# Patient Record
Sex: FEMALE | Race: WHITE
[De-identification: names, ages, dates, MRNs, and addresses within clinical notes are randomized per-mention and may not be internally consistent; named-entity substitution may affect disease eponyms.]

---

## 2020-07-10 NOTE — PDOC.LDHP
Labor and Delivery H&P


Chief complaint: loss of fluid


HPI: 





25 y/o  @ 26.5 wks presents after sudden LOF per vagina at 12 today. 


she states she was taking a nap when suddenly she had a gush of fluids per 

vagina. she does not know if this was urine or ROM. 


she states it did not smell of urine. 


Pt denies nay further leaking of fluids per vagina after this event. 


denies vag bleeding, vag discharge/irritation/pain. 


last intercourse was >24 hours ago and pt and partner state any time between 3-

5 days ago, but for sure not in the last 24 hours. 


reports good fetal movements. 


c/o cramping located over lower abdomen that radiates straight down. 


Denies Pavon, CP, SOB, fevers. 





PCP: Dr. Escalera. 


Current gestational age (weeks): 26 (5 days )


Grav: 2


Para: 1


OB History Details: 





Preg #1:  4 years ago @ 37 wks 


Current pregnancy complications: none


Past Medical History: 





idiopathic intracranial HTN 


Cholelithiasis 


Current medications: pre- vitamins


Previous surgical history: none


Allergies/Adverse Reactions: 


 Allergies











Allergy/AdvReac Type Severity Reaction Status Date / Time


 


amoxicillin Allergy   Verified 20 14:42











Social history: none





- Physical Exam


Vital signs reviewed and normal: yes


General: NAD, resting, breathing through contractions


Heart: RRR


Lungs: CTAB


Abdomen: gravid


Extremeties: no edema


FHT: variability present


[place] contractions every: non present on TOCO





- Vaginal Exam


cm dilated: 0


Effacement: 0%


Station: -3





- OB Labs


GBS: positive (UTI colonized in current pregnancy)





- Assessment





 25 y/o  @ 26.5 wk 





1. sIUP @ 26.5 wks 


- no ccx on 20 minute


- 20 min fht strip reassuring. 





2. Objective LOF 


- FFN ordered. collected VP3, Gc/C. will f/u with results 


- Sterile spec exam performed: no pooling of fluids on valsalva, yellow d/c. 


- not suspecting ROM. 


will f/u with pt on results. 





3. lower abd pain with cramping


- UA with reflex ccx 


- will f/u on results and ccx 





Dispo: will d/c home and follow up on results via phone. low clinical suspicion 

for PPROM. 





Care plan discussed with Dr. Leiva, who is in agreement with above stated 

plan. 








Addendum - Attending





- Attending Attestation


Date/Time: 20 [id number]





I personally evaluated the patient and discussed the management with Dr. Davey


I agree with the History, Examination, Assessment and Plan documented above 

with any addition or exceptions noted below.


FFN, UA, VP3 NEg. Pt has been called and results shared.

## 2020-07-11 NOTE — PDOC.BPN
- Brief Progress Note





care plan discussed with Dr. Leiva, who opted to d/c pt home with f/u on 

results of FFN, UA, VP3, GC/C. 


Pt d/c home in good condition.


return precautions given. 


f/u with pcp in 2-3 days.

## 2020-09-30 ENCOUNTER — HOSPITAL ENCOUNTER (INPATIENT)
Dept: HOSPITAL 92 - L&D/OP | Age: 25
LOS: 1 days | Discharge: HOME | End: 2020-10-01
Attending: OBSTETRICS & GYNECOLOGY | Admitting: OBSTETRICS & GYNECOLOGY
Payer: COMMERCIAL

## 2020-09-30 VITALS — BODY MASS INDEX: 50.4 KG/M2

## 2020-09-30 DIAGNOSIS — Z3A.39: ICD-10-CM

## 2020-09-30 DIAGNOSIS — B95.1: ICD-10-CM

## 2020-09-30 LAB
HBSAG INDEX: 0.16 S/CO (ref 0–0.99)
HGB BLD-MCNC: 12.2 G/DL (ref 12–16)
MCH RBC QN AUTO: 30.8 PG (ref 27–31)
MCV RBC AUTO: 91.1 FL (ref 78–98)
PLATELET # BLD AUTO: 169 THOU/UL (ref 130–400)
RBC # BLD AUTO: 3.96 MILL/UL (ref 4.2–5.4)
SYPHILIS ANTIBODY INDEX: 0.05 S/CO
WBC # BLD AUTO: 12.6 THOU/UL (ref 4.8–10.8)

## 2020-09-30 PROCEDURE — 36416 COLLJ CAPILLARY BLOOD SPEC: CPT

## 2020-09-30 PROCEDURE — 86780 TREPONEMA PALLIDUM: CPT

## 2020-09-30 PROCEDURE — 99285 EMERGENCY DEPT VISIT HI MDM: CPT

## 2020-09-30 PROCEDURE — 51702 INSERT TEMP BLADDER CATH: CPT

## 2020-09-30 PROCEDURE — 0HQ9XZZ REPAIR PERINEUM SKIN, EXTERNAL APPROACH: ICD-10-PCS | Performed by: OBSTETRICS & GYNECOLOGY

## 2020-09-30 PROCEDURE — 86900 BLOOD TYPING SEROLOGIC ABO: CPT

## 2020-09-30 PROCEDURE — 36415 COLL VENOUS BLD VENIPUNCTURE: CPT

## 2020-09-30 PROCEDURE — 87635 SARS-COV-2 COVID-19 AMP PRB: CPT

## 2020-09-30 PROCEDURE — 86850 RBC ANTIBODY SCREEN: CPT

## 2020-09-30 PROCEDURE — U0003 INFECTIOUS AGENT DETECTION BY NUCLEIC ACID (DNA OR RNA); SEVERE ACUTE RESPIRATORY SYNDROME CORONAVIRUS 2 (SARS-COV-2) (CORONAVIRUS DISEASE [COVID-19]), AMPLIFIED PROBE TECHNIQUE, MAKING USE OF HIGH THROUGHPUT TECHNOLOGIES AS DESCRIBED BY CMS-2020-01-R: HCPCS

## 2020-09-30 PROCEDURE — 85027 COMPLETE CBC AUTOMATED: CPT

## 2020-09-30 PROCEDURE — S0020 INJECTION, BUPIVICAINE HYDRO: HCPCS

## 2020-09-30 PROCEDURE — 86901 BLOOD TYPING SEROLOGIC RH(D): CPT

## 2020-09-30 PROCEDURE — 10907ZC DRAINAGE OF AMNIOTIC FLUID, THERAPEUTIC FROM PRODUCTS OF CONCEPTION, VIA NATURAL OR ARTIFICIAL OPENING: ICD-10-PCS | Performed by: OBSTETRICS & GYNECOLOGY

## 2020-09-30 PROCEDURE — 87340 HEPATITIS B SURFACE AG IA: CPT

## 2020-09-30 RX ADMIN — DOCUSATE CALCIUM SCH MG: 240 CAPSULE, LIQUID FILLED ORAL at 22:09

## 2020-10-01 VITALS — TEMPERATURE: 98.5 F | DIASTOLIC BLOOD PRESSURE: 57 MMHG | SYSTOLIC BLOOD PRESSURE: 116 MMHG

## 2020-10-01 LAB
HGB BLD-MCNC: 12.2 G/DL (ref 12–16)
MCH RBC QN AUTO: 31.3 PG (ref 27–31)
MCV RBC AUTO: 91.5 FL (ref 78–98)
PLATELET # BLD AUTO: 166 THOU/UL (ref 130–400)
RBC # BLD AUTO: 3.9 MILL/UL (ref 4.2–5.4)
WBC # BLD AUTO: 12.3 THOU/UL (ref 4.8–10.8)

## 2020-10-01 RX ADMIN — DOCUSATE CALCIUM SCH MG: 240 CAPSULE, LIQUID FILLED ORAL at 09:03
